# Patient Record
Sex: MALE | NOT HISPANIC OR LATINO | Employment: OTHER | URBAN - METROPOLITAN AREA
[De-identification: names, ages, dates, MRNs, and addresses within clinical notes are randomized per-mention and may not be internally consistent; named-entity substitution may affect disease eponyms.]

---

## 2023-04-30 ENCOUNTER — HOSPITAL ENCOUNTER (EMERGENCY)
Facility: MEDICAL CENTER | Age: 43
End: 2023-04-30
Attending: EMERGENCY MEDICINE
Payer: COMMERCIAL

## 2023-04-30 VITALS
RESPIRATION RATE: 18 BRPM | WEIGHT: 220 LBS | DIASTOLIC BLOOD PRESSURE: 87 MMHG | OXYGEN SATURATION: 99 % | HEIGHT: 76 IN | HEART RATE: 65 BPM | SYSTOLIC BLOOD PRESSURE: 146 MMHG | BODY MASS INDEX: 26.79 KG/M2 | TEMPERATURE: 98.6 F

## 2023-04-30 DIAGNOSIS — S61.210A LACERATION OF RIGHT INDEX FINGER WITHOUT FOREIGN BODY WITHOUT DAMAGE TO NAIL, INITIAL ENCOUNTER: ICD-10-CM

## 2023-04-30 DIAGNOSIS — S61.209A EXTENSOR TENDON LACERATION OF FINGER WITH OPEN WOUND, INITIAL ENCOUNTER: ICD-10-CM

## 2023-04-30 DIAGNOSIS — S56.429A EXTENSOR TENDON LACERATION OF FINGER WITH OPEN WOUND, INITIAL ENCOUNTER: ICD-10-CM

## 2023-04-30 PROCEDURE — 99283 EMERGENCY DEPT VISIT LOW MDM: CPT

## 2023-04-30 RX ORDER — CEPHALEXIN 500 MG/1
500 CAPSULE ORAL ONCE
Status: DISCONTINUED | OUTPATIENT
Start: 2023-04-30 | End: 2023-04-30

## 2023-04-30 RX ORDER — CEPHALEXIN 500 MG/1
500 CAPSULE ORAL 3 TIMES DAILY
Qty: 15 CAPSULE | Refills: 0 | Status: ACTIVE | OUTPATIENT
Start: 2023-04-30 | End: 2023-05-05

## 2023-04-30 ASSESSMENT — PAIN DESCRIPTION - PAIN TYPE: TYPE: ACUTE PAIN

## 2023-05-01 NOTE — ED NOTES
Discharge instructions reviewed with patient/family.  Patient verbalizes understanding of follow up care, medication management and reasons to return to the ER. PIV Removed with no complications. Pt ambulatory to lobby on DC.

## 2023-05-01 NOTE — ED TRIAGE NOTES
"Chief Complaint   Patient presents with    Laceration     Pt transferred from [facility] for a finger laceration to right index finger. Pt was using belt radha and cut himself. Transferring hospital reports 4mg zofran, TDAP, and 2g ANCEF given PTA. XRAY also completed at transferring hospital. Pt reports 3/10 pain at this time.       BIB EMS to [place] [facility], pt on monitor and in gown, labs drawn and sent. Pt consists of: Laceration to right index finger.    Medications given en route:NA. 4mg morphine, 2g ancef, [facility].    BP (!) 169/98   Pulse 60   Temp 37 °C (98.6 °F)   Resp 18   Ht 1.93 m (6' 4\")   Wt 99.8 kg (220 lb)   SpO2 99%   BMI 26.78 kg/m²   "

## 2023-05-01 NOTE — ED PROVIDER NOTES
"ER Provider Note    Scribed for Dr. Panfilo Shook M.D. by Mike Aponte. 4/30/2023  9:06 PM    Primary Care Provider: Pcp Not In Computer    CHIEF COMPLAINT  Chief Complaint   Patient presents with    Laceration     [facility] to right index finger. Pt was using belt radha and cut himself. Transferring hospital reports 4mg zofran, TDAP, and 2g ANCEF given PTA. XRAY also completed at transferring hospital. Pt reports 3/10 pain at this time.     EXTERNAL RECORDS REVIEWED  Outpatient labs & studies Hand [facility]-ray from [facility] shows no fracture    HPI/ROS    LIMITATION TO HISTORY   Select: : None    Poli Neely is a 42 y.o. male who presents to the ED for laceration to the right index finger. He was using a belt rahda and was adjusting the alignment when his finger got caught. He cleaned the wound prior to presenting to the [facility]. They provided zofran, TDAP and Ancef at the [facility]. They sent him here for further care as he states that he cut the finger down to the bone. He denies any allergies, however he was given morphine at the [facility] and states that he is likely allergic to it.    PAST MEDICAL HISTORY  None noted    SURGICAL HISTORY  Past Surgical History:   Procedure Laterality Date    HERNIA REPAIR         FAMILY HISTORY  Family History   Problem Relation Age of Onset    Hyperlipidemia Neg Hx     Hypertension Neg Hx     Heart Disease Neg Hx     Cancer Neg Hx     Diabetes Paternal Grandfather        SOCIAL HISTORY   reports that he has never smoked. He does not have any smokeless tobacco history on file. He reports current alcohol use of about 1.0 oz per week. He reports that he does not use drugs.    CURRENT MEDICATIONS  Previous Medications    No medications on file       ALLERGIES  Morphine    PHYSICAL EXAM  BP (!) 169/98   Pulse 60   Temp 37 °C (98.6 °F)   Resp 18   Ht 1.93 m (6' 4\")   Wt 99.8 kg (220 lb)   SpO2 99%   BMI 26.78 kg/m²   Constitutional: Alert in no " apparent distress.  HENT: No signs of trauma, Bilateral external ears normal, Nose normal.   Eyes: Pupils are equal and reactive, Conjunctiva normal, Non-icteric.   Neck: Normal range of motion, No tenderness, Supple, No stridor.   Lymphatic: No lymphadenopathy noted.   Cardiovascular: Regular rate and rhythm, no murmurs.   Thorax & Lungs: Normal breath sounds, No respiratory distress, No wheezing, No chest tenderness.   Abdomen: Bowel sounds normal, Soft, No tenderness, No masses, No pulsatile masses. No peritoneal signs.  Skin: Warm, Dry, No erythema, No rash.   Back: No bony tenderness, No CVA tenderness.   Extremities: Intact distal pulses, No edema, No tenderness, No cyanosis. Tissue over DIP joint has been completely removed by belt radha. Can flex at MCP, PIP and DIP joint, unable to extend at PIP, obvious tendon exposed.  Musculoskeletal: Good range of motion in all major joints. No tenderness to palpation or major deformities noted.   Neurologic: Alert , Normal motor function, Normal sensory function, No focal deficits noted.   Psychiatric: Affect normal, Judgment normal, Mood normal.     COURSE & MEDICAL DECISION MAKING    ED Observation Status? Yes; I am placing the patient in to an observation status due to a diagnostic uncertainty as well as therapeutic intensity. Patient placed in observation status at 9:11 PM, 4/30/2023.     Observation plan is as follows: Pending evaluation from surgeon, possible admission    Upon Reevaluation, the patient's condition has: Improved; and will be discharged.    Patient discharged from ED Observation status at 9:36 PM (Time) 4/30/2023 (Date).     INITIAL ASSESSMENT AND PLAN  Care Narrative:       9:06 PM - Patient seen and evaluated at bedside. Discussed plan of care, including evaluation for further care. Patient agrees to plan of care.     9:27 PM I discussed the patient's case and the above findings with Dr. Ritter (Ortho) who states that there is no emergent  need for surgery at this time and he will see the patient in clinic tomorrow.     9:35 PM - Patient was reevaluated at bedside. Discussed that there is no need for emergent surgery at this time and that he is safe to follow up with ortho tomorrow. Patient will now be discharged at this time. Discussed return precautions and plan for follow up. Patient verbalizes understanding and agreement to this plan of care.       Patient does have what appears to be a tendon injury.  We will give the patient antibiotics for home.  We will have the patient follow-up with orthopedic surgery.  There is no laceration to repair at this time.  We will discharge the patient home with strict return precautions and follow-up.               DISPOSITION AND DISCUSSIONS  I have discussed management of the patient with the following physicians and CARLO's: Dr. Ritter (Ortho)     Discussion of management with other [facility] or appropriate source(s): None     Escalation of care considered, and ultimately not performed: acute inpatient care management, however at this time, the patient is most appropriate for outpatient management.    Barriers to care at this time, including but not limited to: None    DISPOSITION:  Patient will be discharged home in stable condition.    FOLLOW UP:  Terrence Ritter M.D.  [address] [facility] [address]  [phone]    In 1 day      OUTPATIENT MEDICATIONS:  New Prescriptions    CEPHALEXIN (KEFLEX) 500 MG CAP    Take 1 Capsule by mouth 3 times a day for 5 days.      FINAL IMPRESSION   1. Laceration of right index finger without foreign body without damage to nail, initial encounter    2. Extensor tendon laceration of finger with open wound, initial encounter      Mike INTERIANO), am scribing for, and in the presence of, Panfilo Shook M.D..    Electronically signed by: Mike Aponte (Kimberly), 4/30/2023    IPanfilo M.D. personally performed the services described in this documentation, as  scribed by Mike Aponte in my presence, and it is both accurate and complete.    The note accurately reflects work and decisions made by me.  Panfilo Shook M.D.  5/1/2023  3:16 AM

## 2024-09-30 ENCOUNTER — RESEARCH ENCOUNTER (OUTPATIENT)
Dept: RESEARCH | Facility: MEDICAL CENTER | Age: 44
End: 2024-09-30
Payer: COMMERCIAL